# Patient Record
Sex: FEMALE | Race: WHITE
[De-identification: names, ages, dates, MRNs, and addresses within clinical notes are randomized per-mention and may not be internally consistent; named-entity substitution may affect disease eponyms.]

---

## 2017-11-11 ENCOUNTER — HOSPITAL ENCOUNTER (OUTPATIENT)
Dept: HOSPITAL 39 - LAB.O | Age: 72
Discharge: HOME | End: 2017-11-11
Attending: FAMILY MEDICINE
Payer: MEDICARE

## 2017-11-11 DIAGNOSIS — I10: Primary | ICD-10-CM

## 2017-12-05 ENCOUNTER — HOSPITAL ENCOUNTER (OUTPATIENT)
Dept: HOSPITAL 39 - CT | Age: 72
Discharge: HOME | End: 2017-12-05
Attending: FAMILY MEDICINE
Payer: MEDICARE

## 2017-12-05 DIAGNOSIS — R42: Primary | ICD-10-CM

## 2017-12-06 NOTE — CT
EXAM DATE:  12/5/2017 1:40 PM CST.



PROCEDURE: CT HEAD WITHOUT THEN WITH IV CONTRAST.



INDICATION: DIZZINESS AND GIDDINESS.



COMPARISON: None.



TECHNIQUE: Axial CT images of the head were acquired before and

after administration of intravenous contrast.



This exam was performed according to our departmental

dose-optimization program which includes use of Automated

Exposure Control, adjustment of the mA and/or kV according to

patient size and/or use of iterative reconstruction technique. 



FINDINGS:

No acute intracranial hemorrhage.

Gray white matter differentiation is preserved.

No mass effect or midline shift.

No abnormal intracranial enhancement.



Mild generalized brain volume loss.

Unremarkable orbits.

The paranasal sinuses are well aerated.

Mastoid air cells are clear.

Intact calvarium.



IMPRESSION:



No acute intracranial abnormality. No abnormal intracranial

enhancement.



Mild generalized brain volume loss.



Electronically signed by:  Himanshu Aguirre MD  12/6/2017 2:37 AM [facility]

Workstation: [id number]

## 2018-08-24 ENCOUNTER — HOSPITAL ENCOUNTER (INPATIENT)
Dept: HOSPITAL 39 - ER | Age: 73
LOS: 1 days | Discharge: ANOTHER HOSPITAL | DRG: 379 | End: 2018-08-25
Attending: NURSE PRACTITIONER | Admitting: NURSE PRACTITIONER
Payer: MEDICARE

## 2018-08-24 DIAGNOSIS — K57.30: ICD-10-CM

## 2018-08-24 DIAGNOSIS — Z98.890: ICD-10-CM

## 2018-08-24 DIAGNOSIS — Z79.899: ICD-10-CM

## 2018-08-24 DIAGNOSIS — Z79.82: ICD-10-CM

## 2018-08-24 DIAGNOSIS — E87.6: ICD-10-CM

## 2018-08-24 DIAGNOSIS — B02.9: ICD-10-CM

## 2018-08-24 DIAGNOSIS — I10: ICD-10-CM

## 2018-08-24 DIAGNOSIS — G62.9: ICD-10-CM

## 2018-08-24 DIAGNOSIS — E78.5: ICD-10-CM

## 2018-08-24 DIAGNOSIS — K92.1: Primary | ICD-10-CM

## 2018-08-24 PROCEDURE — 30233N1 TRANSFUSION OF NONAUTOLOGOUS RED BLOOD CELLS INTO PERIPHERAL VEIN, PERCUTANEOUS APPROACH: ICD-10-PCS | Performed by: NURSE PRACTITIONER

## 2018-08-24 NOTE — CT
EXAM DESCRIPTION: 



Abdoment/Pelvis w/o Contrast



CLINICAL HISTORY: 



rectal bleeding



COMPARISON: 



None Available



TECHNIQUE: 



CT of the abdomen and Pelvis was performed without IV contrast.

This exam was performed according to our departmental

dose-optimization program, which includes automated exposure

control, adjustment of the mA and/or kV according to patient size

and/or use of iterative reconstruction technique.



FINDINGS: 



No lung base abnormality. No pneumoperitoneum or ascites. There

are a few nonenlarged left retroperitoneal lymph nodes. The

gallbladder is surgically absent. The liver, spleen, pancreas,

adrenals and kidneys are unremarkable for noncontrast technique.

No dilated small bowel loops or mesenteric inflammation. No

bladder wall thickening. The uterus and ovaries are not seen,

correlate with surgical history. Evaluation of the colon and

rectum is limited by lack of oral contrast on this exam. No

definite rectal wall thickening or perirectal inflammation.

Colonic diverticulosis is noted without diverticulitis. Normal

appendix. No concerning bone lesion. Degenerative disc disease is

present at L4-5.



IMPRESSION: 



Limited evaluation of the colon related to lack of oral and IV

contrast without definite colonic wall thickening or other

abnormality to explain rectal bleeding. If symptoms persist or

worsen, colonoscopy should be considered for further evaluation.



Colonic diverticulosis without evidence of diverticulitis.



Electronically signed by:  Rodriguez Estes MD  8/24/2018 11:57 AM CDT

Workstation: [id number]

## 2018-08-24 NOTE — HP
SUPERVISING PHYSICIAN:  Saul Pride M.D.



CHIEF COMPLAINT: Blood in stool.



HISTORY OF PRESENT ILLNESS:  This is a 73 year-old female patient who has lived 
in [facility] for approximately 1 year.  Her primary care physician is 
Karina Longoria in [place].  During the night she woke up and thought she had 
had a fairly significant bout of diarrhea.  She said she could not make it to 
the toilet and she actually had an accident on the floor.  Her sister noticed 
that there was blood in the stool and so later in the morning she decided to 
call EMS and was brought to the Emergency Room for rectal bleeding.  She had a 
colonoscopy a little over a week ago at [facility] and per the E. R. doctor he 
spoke with the gastroenterologist, Dr. Palmer, and there was no significant 
bleeding noted on her colonoscopy.  Initially her hemoglobin was 9 and 
hematocrit was 26.3.  Her H&H was rechecked approximately 3 hours later and it 
was 8.9 and 26.3.  Her potassium was slightly low at 3.5 and her BUN was 23 
with creatinine of 0.53.  Calcium was 7.9.  She did have a positive stool for 
occult blood.  Chest x-ray was done and she had no acute process identified.  A 
CT of the abdomen showed limited evaluation of the colon related to lack of 
oral or IV contrast without definite colonic wall thickening or other 
abnormality to explain rectal bleeding.  It showed colonic diverticulosis 
without evidence of diverticulitis.  I was called for hospital admission.



PAST MEDICAL HISTORY: 

1.   Lower extremity neuropathy.

2.   Hypertension.

3.   Hyperlipidemia.



PAST SURGICAL HISTORY:

1.   Hysterectomy.

2.   Cholecystectomy.



CURRENT MEDICATIONS:  As per the EMR and awaiting verification.



ALLERGIES:  NO KNOWN DRUG ALLERGIES.



FAMILY HISTORY:  



SOCIAL HISTORY:  She lives at [facility].  She denies any smoking, ETOH or 
illicit drug use.



REVIEW OF SYSTEMS: 

GENERAL:  Denies fatigue, fever or weight changes.

HEENT:  Denies sinus symptoms, ear pain, vision changes, sore throat or sinus 
symptoms.

RESPIRATORY:  Denies coughing, wheezing or shortness of breath.

CARDIAC:  Denies palpitations, tachycardia or chest pain.

GASTROINTESTINAL:  As per history of present illness.

GENITOURINARY:  Denies hematuria, polyuria, dysuria.

MUSCULOSKELETAL:  Denies arthralgias or myalgias.

SKIN:  Denies lesions or rashes.

NEUROLOGIC:  Denies headache, seizures or dizziness.



PHYSICAL EXAMINATION: 



VITAL SIGNS:  Temperature 99.5, pulse 78, blood pressure 116/67, respiratory 
rate 20, O2 sat 99% on room air.



GENERAL:  This is a 73 year-old female patient lying in her hospital bed.  She 
is in no acute distress. 



HEENT:  Normocephalic and atraumatic.  Pupils are equal and reactive.  
Oropharynx is clear.  



NECK:  Supple without mass.



RESPIRATORY:  Essentially clear to auscultation bilaterally.



CHEST:  There is equal rise and fall of the chest with inspiration and 
expiration.



CARDIOVASCULAR:  Regular rate and rhythm.



GASTROINTESTINAL:  Abdomen is soft, nondistended, non-tender.  Bowel sounds are 
positive.



RECTAL:  Exam shows no evidence of external hemorrhoids.



EXTREMITIES:  No cyanosis, clubbing or edema.



NEUROLOGIC:  She is awake, alert and oriented times three.



LABORATORY:  Labs and films are as per History of Present Illness.



ASSESSMENT: 

1.   Lower gastrointestinal bleed with recent colonoscopy and removal of 
several polyps.

2.   Hypertension.

3.   Lower limb neuropathy.

4.   Hyperlipidemia.

5.   Chronic shingles.

6.   Herpes.



PLAN:  We will place the patient in Observation.  She has been typed and 
crossed for blood transfusion.  We will do serial H&Hs and monitor for any 
bleeding.  She has not had any recurrent stools with blood in it, although she 
was guaiac positive.  She may require 2 units of packed red cells and hopefully 
we can get her discharged for close followup with her GI physician early next 
week.  It is very difficult to ascertain if the GI bleed was bright red or 
darker red as I have heard several stories, but it may be helpful to get an EGD 
in the near future.  Will continue to monitor her closely and follow as needed.
  Dr. Pride is the collaborating physician available for consultation.



#[id number]
[facility]

## 2018-08-24 NOTE — RAD
EXAM DESCRIPTION: Chest,1 View



CLINICAL HISTORY: 73 years Female, FALL



COMPARISON: None.



TECHNIQUE: AP portable chest.



FINDINGS:



Heart size is prominent with normal pulmonary vascularity.



No consolidating infiltrate.



No pulmonary mass or worrisome nodule.



No pneumothorax or pleural effusion.



Bones are unremarkable.



IMPRESSION:



No acute process is identified in the chest.



Electronically signed by:  John Gonzalez MD  8/24/2018 10:20

AM CDT Workstation: [id number]

## 2018-08-25 VITALS — TEMPERATURE: 98.9 F | DIASTOLIC BLOOD PRESSURE: 78 MMHG | SYSTOLIC BLOOD PRESSURE: 134 MMHG

## 2018-08-25 VITALS — OXYGEN SATURATION: 95 %

## 2018-08-25 PROCEDURE — 30233N1 TRANSFUSION OF NONAUTOLOGOUS RED BLOOD CELLS INTO PERIPHERAL VEIN, PERCUTANEOUS APPROACH: ICD-10-PCS | Performed by: NURSE PRACTITIONER

## 2018-08-25 NOTE — DS
SUPERVISING PHYSICIAN:  Saul Pride M.D.



DISCHARGE DIAGNOSIS: 

1.   Lower gastrointestinal bleed with colonoscopy approximately 1 week ago 
with 

      removal of several polyps.

2.   Hypertension.

3.   Lower limb neuropathy.

4.   Hyperlipidemia.

5.   Chronic shingles.

6.   Herpes.



HISTORY OF PRESENT ILLNESS:  This is a 73 year-old female patient who presented 
to the Emergency Room today after she woke up early this morning and was 
incontinent of stool.  She said she just couldn't hold her bowel movement and 
actually found blood in her stool approximately 1 week ago.  She had a 
colonoscopy per Dr. Palmer in [place] and he removed several polyps.  In the 
Emergency Room today her initial hemoglobin was 9 and hematocrit was 26.3.  
Approximately 3 hours later her H&H was checked again and it was 8.9 and 26.3.  
Potassium was slightly low at 3.5, BUN 23, creatinine 0.53.  Calcium was 7.9.  
She did have a positive stool for occult blood.  Chest x-ray was done and 
showed  no acute process identified.  A CT of the abdomen showed limited 
evaluation of the colon related to lack of oral or IV contrast without definite 
colonic wall thickening or other abnormalities to explain rectal bleeding.  It 
showed colonic diverticulosis without evidence of diverticulitis.  I was called 
for hospital admission.



HOSPITAL COURSE:  The patient was initially placed in Observation and serial H&
Hs were ordered.  Her next H&H was 8.1 and 24.1.  Vital signs remained stable.  
Blood pressure dropped to 100/61 with a repeat of 92/62 at approximately 8:30 
PM.  She also complained of some weakness and dizziness.  During her stay she 
had 2 bowel movements of which both had a small to moderate amount of dark red 
blood noted.  She had been typed and crossed for 2 units of blood and those 
were started after her 9:00 PM hemoglobin and hematocrit were 7.9 and 2.5.  She 
also received 40 mg of Protonix IV push and due to the lack of GI intervention, 
I called her GI doctor, Dr. Palmer, and he said that he was not on call but would 
like for her to be transferred to [facility] in [place], [place].  After 
speaking to [facility], they had no GI available 
at [place], so she was accepted at [facility] in [place].  She was 
accepted by Dr. Chavis at [facility].  Their GI doctor will be Dr. Bahena.



DISCHARGE PLAN:  the patient will be discharged to [facility] in 
[place], [place].  She will go via [facility].  The hospitalist is Dr. Singh and the 
GI doctor is Dr. Bahena.  She is in fair condition.  Her vital signs are stable 
at this time.  She has received 1 unit of packed red blood cells and prior to 
her transfer to [facility], we will start her second unit of blood.  All of her 
records and labs and films will be accompanying the patient.  After discharge, 
it is recommended that she get a local physician.  I believe her primary care 
physician is Karina Longoria in [place].  She does not have a local physician 
and she does live at \[facility]"" for about the last year.  Her 
discharge medications are:



1.   Gabapentin.

2.   Amlodipine.

3.   Hydrochlorothiazide.

4.   Valacyclovir.

5.   Atorvastatin.  

6.   Aspirin, although it has been held during this hospital visit.



#[id number]
[facility]

## 2021-07-29 ENCOUNTER — HOSPITAL ENCOUNTER (EMERGENCY)
Dept: HOSPITAL 92 - ERS | Age: 76
Discharge: NURSING, REHAB OR LONG-TERM CARE | End: 2021-07-29
Payer: MEDICARE

## 2021-07-29 DIAGNOSIS — R42: Primary | ICD-10-CM

## 2021-07-29 DIAGNOSIS — R11.2: ICD-10-CM

## 2021-07-29 DIAGNOSIS — E78.00: ICD-10-CM

## 2021-07-29 DIAGNOSIS — I10: ICD-10-CM

## 2021-07-29 DIAGNOSIS — Z79.899: ICD-10-CM

## 2021-07-29 LAB
ALBUMIN SERPL BCG-MCNC: 3.9 G/DL (ref 3.4–4.8)
ALP SERPL-CCNC: 71 U/L (ref 40–110)
ALT SERPL W P-5'-P-CCNC: 40 U/L (ref 8–55)
ANION GAP SERPL CALC-SCNC: 12 MMOL/L (ref 10–20)
AST SERPL-CCNC: 36 U/L (ref 5–34)
BACTERIA UR QL AUTO: (no result) HPF
BASOPHILS # BLD AUTO: 0 THOU/UL (ref 0–0.2)
BASOPHILS NFR BLD AUTO: 0.1 % (ref 0–1)
BILIRUB SERPL-MCNC: 0.5 MG/DL (ref 0.2–1.2)
BUN SERPL-MCNC: 13 MG/DL (ref 9.8–20.1)
CALCIUM SERPL-MCNC: 8.8 MG/DL (ref 7.8–10.44)
CHLORIDE SERPL-SCNC: 105 MMOL/L (ref 98–107)
CO2 SERPL-SCNC: 26 MMOL/L (ref 23–31)
CREAT CL PREDICTED SERPL C-G-VRATE: 0 ML/MIN (ref 70–130)
EOSINOPHIL # BLD AUTO: 0 THOU/UL (ref 0–0.7)
EOSINOPHIL NFR BLD AUTO: 0.1 % (ref 0–10)
GLOBULIN SER CALC-MCNC: 2.9 G/DL (ref 2.4–3.5)
GLUCOSE SERPL-MCNC: 138 MG/DL (ref 83–110)
HGB BLD-MCNC: 13.9 G/DL (ref 12–16)
LEUKOCYTE ESTERASE UR QL STRIP.AUTO: 25 LEU/UL
LYMPHOCYTES # BLD: 0.4 THOU/UL (ref 1.2–3.4)
LYMPHOCYTES NFR BLD AUTO: 4.6 % (ref 21–51)
MCH RBC QN AUTO: 33.5 PG (ref 27–31)
MCV RBC AUTO: 97.1 FL (ref 78–98)
MONOCYTES # BLD AUTO: 0.2 THOU/UL (ref 0.11–0.59)
MONOCYTES NFR BLD AUTO: 2.4 % (ref 0–10)
NEUTROPHILS # BLD AUTO: 8.9 THOU/UL (ref 1.4–6.5)
NEUTROPHILS NFR BLD AUTO: 92.8 % (ref 42–75)
PLATELET # BLD AUTO: 175 THOU/UL (ref 130–400)
POTASSIUM SERPL-SCNC: 3.4 MMOL/L (ref 3.5–5.1)
RBC # BLD AUTO: 4.15 MILL/UL (ref 4.2–5.4)
RBC UR QL AUTO: (no result) HPF (ref 0–3)
SODIUM SERPL-SCNC: 140 MMOL/L (ref 136–145)
SP GR UR STRIP: 1.01 (ref 1–1.04)
WBC # BLD AUTO: 9.6 THOU/UL (ref 4.8–10.8)
WBC UR QL AUTO: (no result) HPF (ref 0–3)

## 2021-07-29 PROCEDURE — 70450 CT HEAD/BRAIN W/O DYE: CPT

## 2021-07-29 PROCEDURE — 80053 COMPREHEN METABOLIC PANEL: CPT

## 2021-07-29 PROCEDURE — 81003 URINALYSIS AUTO W/O SCOPE: CPT

## 2021-07-29 PROCEDURE — 93005 ELECTROCARDIOGRAM TRACING: CPT

## 2021-07-29 PROCEDURE — 84484 ASSAY OF TROPONIN QUANT: CPT

## 2021-07-29 PROCEDURE — 81015 MICROSCOPIC EXAM OF URINE: CPT

## 2021-07-29 PROCEDURE — 51701 INSERT BLADDER CATHETER: CPT

## 2021-07-29 PROCEDURE — 71045 X-RAY EXAM CHEST 1 VIEW: CPT

## 2021-07-29 PROCEDURE — 85025 COMPLETE CBC W/AUTO DIFF WBC: CPT

## 2021-11-06 ENCOUNTER — HOSPITAL ENCOUNTER (EMERGENCY)
Dept: HOSPITAL 92 - ERS | Age: 76
Discharge: HOME | End: 2021-11-06
Payer: MEDICARE

## 2021-11-06 DIAGNOSIS — E78.00: ICD-10-CM

## 2021-11-06 DIAGNOSIS — F03.90: ICD-10-CM

## 2021-11-06 DIAGNOSIS — R25.1: Primary | ICD-10-CM

## 2021-11-06 DIAGNOSIS — Z79.899: ICD-10-CM

## 2021-11-06 DIAGNOSIS — I10: ICD-10-CM

## 2021-11-06 LAB
ALBUMIN SERPL BCG-MCNC: 4.3 G/DL (ref 3.4–4.8)
ALP SERPL-CCNC: 94 U/L (ref 40–110)
ALT SERPL W P-5'-P-CCNC: 41 U/L (ref 8–55)
ANION GAP SERPL CALC-SCNC: 15 MMOL/L (ref 10–20)
AST SERPL-CCNC: 52 U/L (ref 5–34)
BASOPHILS # BLD AUTO: 0 THOU/UL (ref 0–0.2)
BASOPHILS NFR BLD AUTO: 0.7 % (ref 0–1)
BILIRUB SERPL-MCNC: 0.9 MG/DL (ref 0.2–1.2)
BUN SERPL-MCNC: 9 MG/DL (ref 9.8–20.1)
CALCIUM SERPL-MCNC: 9.5 MG/DL (ref 7.8–10.44)
CHLORIDE SERPL-SCNC: 105 MMOL/L (ref 98–107)
CO2 SERPL-SCNC: 25 MMOL/L (ref 23–31)
CREAT CL PREDICTED SERPL C-G-VRATE: 0 ML/MIN (ref 70–130)
EOSINOPHIL # BLD AUTO: 0 THOU/UL (ref 0–0.7)
EOSINOPHIL NFR BLD AUTO: 0.4 % (ref 0–10)
GLOBULIN SER CALC-MCNC: 3.8 G/DL (ref 2.4–3.5)
GLUCOSE SERPL-MCNC: 100 MG/DL (ref 83–110)
HGB BLD-MCNC: 15.4 G/DL (ref 12–16)
LIPASE SERPL-CCNC: 18 U/L (ref 8–78)
LYMPHOCYTES # BLD: 0.9 THOU/UL (ref 1.2–3.4)
LYMPHOCYTES NFR BLD AUTO: 17.2 % (ref 21–51)
MCH RBC QN AUTO: 32.9 PG (ref 27–31)
MCV RBC AUTO: 96.8 FL (ref 78–98)
MONOCYTES # BLD AUTO: 0.3 THOU/UL (ref 0.11–0.59)
MONOCYTES NFR BLD AUTO: 5.6 % (ref 0–10)
NEUTROPHILS # BLD AUTO: 3.9 THOU/UL (ref 1.4–6.5)
NEUTROPHILS NFR BLD AUTO: 76.1 % (ref 42–75)
PLATELET # BLD AUTO: 208 THOU/UL (ref 130–400)
POTASSIUM SERPL-SCNC: 4.5 MMOL/L (ref 3.5–5.1)
RBC # BLD AUTO: 4.67 MILL/UL (ref 4.2–5.4)
SODIUM SERPL-SCNC: 140 MMOL/L (ref 136–145)
WBC # BLD AUTO: 5.1 THOU/UL (ref 4.8–10.8)

## 2021-11-06 PROCEDURE — 94760 N-INVAS EAR/PLS OXIMETRY 1: CPT

## 2021-11-06 PROCEDURE — 84484 ASSAY OF TROPONIN QUANT: CPT

## 2021-11-06 PROCEDURE — 85025 COMPLETE CBC W/AUTO DIFF WBC: CPT

## 2021-11-06 PROCEDURE — 71045 X-RAY EXAM CHEST 1 VIEW: CPT

## 2021-11-06 PROCEDURE — 83690 ASSAY OF LIPASE: CPT

## 2021-11-06 PROCEDURE — 93005 ELECTROCARDIOGRAM TRACING: CPT

## 2021-11-06 PROCEDURE — 80053 COMPREHEN METABOLIC PANEL: CPT

## 2022-12-30 ENCOUNTER — HOSPITAL ENCOUNTER (EMERGENCY)
Dept: HOSPITAL 92 - ERS | Age: 77
Discharge: HOME | End: 2022-12-30
Payer: MEDICARE

## 2022-12-30 DIAGNOSIS — E78.00: ICD-10-CM

## 2022-12-30 DIAGNOSIS — I10: ICD-10-CM

## 2022-12-30 DIAGNOSIS — R42: Primary | ICD-10-CM

## 2022-12-30 DIAGNOSIS — R00.2: ICD-10-CM

## 2022-12-30 LAB
ALBUMIN SERPL BCG-MCNC: 3.4 G/DL (ref 3.4–4.8)
ALP SERPL-CCNC: 78 U/L (ref 40–110)
ALT SERPL W P-5'-P-CCNC: 13 U/L (ref 8–55)
ANION GAP SERPL CALC-SCNC: 11 MMOL/L (ref 10–20)
AST SERPL-CCNC: 21 U/L (ref 5–34)
BACTERIA UR QL AUTO: (no result) HPF
BASOPHILS # BLD AUTO: 0 THOU/UL (ref 0–0.2)
BASOPHILS NFR BLD AUTO: 0.5 % (ref 0–1)
BILIRUB SERPL-MCNC: 0.4 MG/DL (ref 0.2–1.2)
BUN SERPL-MCNC: 10 MG/DL (ref 9.8–20.1)
CALCIUM SERPL-MCNC: 8.5 MG/DL (ref 7.8–10.44)
CHLORIDE SERPL-SCNC: 107 MMOL/L (ref 98–107)
CO2 SERPL-SCNC: 26 MMOL/L (ref 23–31)
CREAT CL PREDICTED SERPL C-G-VRATE: 0 ML/MIN (ref 70–130)
DIGOXIN SERPL-MCNC: (no result) NG/ML (ref 0.8–2)
EOSINOPHIL # BLD AUTO: 0.1 THOU/UL (ref 0–0.7)
EOSINOPHIL NFR BLD AUTO: 3 % (ref 0–10)
GLOBULIN SER CALC-MCNC: 2.3 G/DL (ref 2.4–3.5)
GLUCOSE SERPL-MCNC: 103 MG/DL (ref 83–110)
HGB BLD-MCNC: 12.3 G/DL (ref 12–16)
LEUKOCYTE ESTERASE UR QL STRIP.AUTO: 75 LEU/UL
LYMPHOCYTES # BLD: 1.5 THOU/UL (ref 1.2–3.4)
LYMPHOCYTES NFR BLD AUTO: 31.7 % (ref 21–51)
MCH RBC QN AUTO: 32.9 PG (ref 27–31)
MCV RBC AUTO: 96.5 FL (ref 78–98)
MONOCYTES # BLD AUTO: 0.5 THOU/UL (ref 0.11–0.59)
MONOCYTES NFR BLD AUTO: 11.7 % (ref 0–10)
NEUTROPHILS # BLD AUTO: 2.5 THOU/UL (ref 1.4–6.5)
NEUTROPHILS NFR BLD AUTO: 53.1 % (ref 42–75)
PLATELET # BLD AUTO: 172 10X3/UL (ref 130–400)
POTASSIUM SERPL-SCNC: 3.1 MMOL/L (ref 3.5–5.1)
RBC # BLD AUTO: 3.75 MILL/UL (ref 4.2–5.4)
RBC UR QL AUTO: (no result) HPF (ref 0–3)
SODIUM SERPL-SCNC: 141 MMOL/L (ref 136–145)
SP GR UR STRIP: 1.01 (ref 1–1.04)
WBC # BLD AUTO: 4.6 10X3/UL (ref 4.8–10.8)
WBC UR QL AUTO: (no result) HPF (ref 0–3)

## 2022-12-30 PROCEDURE — 84484 ASSAY OF TROPONIN QUANT: CPT

## 2022-12-30 PROCEDURE — 81003 URINALYSIS AUTO W/O SCOPE: CPT

## 2022-12-30 PROCEDURE — 71045 X-RAY EXAM CHEST 1 VIEW: CPT

## 2022-12-30 PROCEDURE — 85025 COMPLETE CBC W/AUTO DIFF WBC: CPT

## 2022-12-30 PROCEDURE — 70450 CT HEAD/BRAIN W/O DYE: CPT

## 2022-12-30 PROCEDURE — 36415 COLL VENOUS BLD VENIPUNCTURE: CPT

## 2022-12-30 PROCEDURE — 80053 COMPREHEN METABOLIC PANEL: CPT

## 2022-12-30 PROCEDURE — 81015 MICROSCOPIC EXAM OF URINE: CPT

## 2022-12-30 PROCEDURE — 93005 ELECTROCARDIOGRAM TRACING: CPT

## 2022-12-30 PROCEDURE — 80162 ASSAY OF DIGOXIN TOTAL: CPT

## 2022-12-30 PROCEDURE — 83605 ASSAY OF LACTIC ACID: CPT
